# Patient Record
Sex: MALE | Race: WHITE | HISPANIC OR LATINO | URBAN - METROPOLITAN AREA
[De-identification: names, ages, dates, MRNs, and addresses within clinical notes are randomized per-mention and may not be internally consistent; named-entity substitution may affect disease eponyms.]

---

## 2020-09-15 ENCOUNTER — CONVERSION ENCOUNTER (OUTPATIENT)
Dept: UROLOGY | Facility: CLINIC | Age: 25
End: 2020-09-15

## 2020-09-15 ENCOUNTER — OFFICE VISIT CONVERTED (OUTPATIENT)
Dept: UROLOGY | Facility: CLINIC | Age: 25
End: 2020-09-15
Attending: NURSE PRACTITIONER

## 2020-10-06 ENCOUNTER — OFFICE VISIT CONVERTED (OUTPATIENT)
Dept: UROLOGY | Facility: CLINIC | Age: 25
End: 2020-10-06
Attending: UROLOGY

## 2020-10-07 ENCOUNTER — HOSPITAL ENCOUNTER (OUTPATIENT)
Dept: GENERAL RADIOLOGY | Facility: HOSPITAL | Age: 25
Discharge: HOME | End: 2020-10-07
Attending: NURSE PRACTITIONER

## 2021-05-10 NOTE — H&P
"   History and Physical      Patient Name: Sheng Mi   Patient ID: [id number]   Sex: Male   YOB: 1995        Visit Date: September 15, 2020    Provider: IDA Gomes   Location: [facility]   Location Address: [address] [place] [address], [address]  [place], [place]  [id number]   Location Phone: [phone]          Chief Complaint  · lump left testicle       History Of Present Illness  The patient is a 25 year old /White male , who presents for evaluation of lump on left testicle, he is self referred. He has not had any previous evaluation or imaging. He feels something \"in there\" referring to scrotum and has been there for a long time at least 2 years noticied while in . Began having some discomfort and pressure 2 weeks ago with sudden onset and discomfort has increased. Got out of  in March and on exit exam advised to check on left testicle if planning on having children in the future. He was tested for std and negative at that time. Not sexually active since then. He does have finacee and plans for marriage in the future. Denies dysuria or hematuria. No voiding complaints       Allergy List  NO KNOWN DRUG ALLERGIES         Family Medical History  Cancer, Unspecified; Diabetes         Social History  Alcohol (Never); Tobacco (Never)         Review of Systems  · Constitutional  o Denies  o : fever, headache, chills  · Eyes  o Denies  o : eye pain, double vision, blurred vision  · HENT  o Denies  o : sinus problems, sore throat, ear infection  · Cardiovascular  o Denies  o : chest pain, high blood pressure, varicosities  · Respiratory  o Denies  o : shortness of breath, wheezing, frequent cough  · Gastrointestinal  o Denies  o : nausea, vomiting, heartburn, indigestion, abdominal pain  · Genitourinary  o Admits  o : scrotal discomfort left and lumo  o Denies  o : urgency, frequency, urinary retention, painful urination  · Integument  o Denies  o : rash, itching, " "boils  · Neurologic  o Denies  o : tingling or numbness, tremors, dizzy spells  · Musculoskeletal  o Denies  o : joint pain, neck pain, back pain  · Endocrine  o Denies  o : cold intolerance, heat intolerance, tired, excessive thirst, sluggish  · Psychiatric  o Admits  o : feels satisfied with life  o Denies  o : severe depression, concerns with hurting themselves  · Heme-Lymph  o Denies  o : swollen glands, blood clotting problems  · Allergic-Immunologic  o Denies  o : sinus allergy symptoms, hay fever      Vitals  Date Time BP Position Site L\R Cuff Size HR RR TEMP (F) WT  HT  BMI kg/m2 BSA m2 O2 Sat HC       09/15/2020 02:15 PM 94/74 Sitting    75 - R   150lbs 0oz 5'  5\" 24.96 1.77           Physical Examination  · Constitutional  o Appearance  o : Well nourished, well developed patient in no acute distress. Ambulating without difficulty.  · Respiratory  o Respiratory Effort  o : Breathing is unlabored without accessory muscle use  o Inspection of Chest  o : normal appearance, no retractions  o Auscultation of Lungs  o : Normal breath sounds  · Cardiovascular  o Heart  o :   § Auscultation of Heart  § : regular rate and rhythm, no murmurs, gallops or rubs  o Peripheral Vascular System  o : No abnormalities  · Gastrointestinal  o Abdominal Examination  o : Scaphoid abdomen which is non-tender to palpation with normal tone and without rigidity or guarding. Normal bowel sounds. No masses present.  o Liver and spleen  o : No hepatomegaly present. Liver is non-tender to palpation and spleen is not palpable.  o Hernias  o : No abdominal wall hernias are present.  · Genitourinary  o Bladder  o : no abnormalities  o Penis  o : Normal appearance without lesion, discharge or masses.  o Urethral Meatus  o : no abnormalities  o Scrotum and Scrotal Contents  o :   § Scrotum  § : no abnormalities  § Epididymides  § : fullness and tenderness left epididymis. there is a varicocele present  § Testes  § : no " abnormalities  · Lymphatic  o Groin  o : lymph node right slight enlargement  · Skin and Subcutaneous Tissue  o General Inspection  o : No rashes, lesions or areas of discoloration present. Skin turgor is normal.  o General Palpation  o : No abnormalities, masses or tenderness on palpation.  · Neurologic  o Mental Status Examination  o : grossly oriented to person, place and time  o Gait and Station  o : normal gait, able to stand without difficulty  · Psychiatric  o Mood and Affect  o : mood normal, affect appropriate      Figure 1.0: Pain Rating Scale-Joe         Results  · In-Office Procedures  o Lab procedure  § Automated dipstick urinalysis with microscopy (23696)   § Color Ur: Yellow   § Clarity Ur: Clear   § Glucose Ur Ql Strip: Negative   § Bilirub Ur Ql Strip: Negative   § Ketones Ur Ql Strip: Negative   § Sp Gr Ur Qn: greater than 1.030   § Hgb Ur Ql Strip: Negative   § pH Ur-LsCnc: 7.5   § Prot Ur Ql Strip: Negative   § Urobilinogen Ur Strip-mCnc: 1.0 E.U./dL   § Nitrite Ur Ql Strip: Negative   § WBC Est Ur Ql Strip: Negative   § RBC UrnS Qn HPF: 0   § WBC UrnS Qn HPF: 0   § Bacteria UrnS Qn HPF: 0   § Crystals UrnS Qn HPF: amorphous sediment   § Epithelial Cells (non renal): 0 /HPF  § Epithelial Cells (renal): 0       Assessment  · Scrotal disorder     608.9/N50.9  · Varicocele     456.4/I86.1  · Epididymitis, left     604.90/N45.1    Problems Reconciled  Plan  · Medications  o doxycycline monohydrate 100 mg oral capsule   SIG: take 1 capsule (100 mg) by oral route 2 times per day for 21 days   DISP: (42) capsules with 0 refills  Prescribed on 09/15/2020     o Medications have been Reconciled  o Transition of Care or Provider Policy     will rx doxycycline for infection and follow up 3 weeks. will then evaluate with testicular ultrasound and if desired may consider surgical intervention of varicocele if desired.             Electronically Signed by: Marisa Gautam APRN -Author on September 15, 2020  03:20:57 PM

## 2021-05-10 NOTE — PROCEDURES
Procedure Note      Patient Name: Sheng Mi   Patient ID: [id number]   Sex: Male   YOB: 1995    Primary Care Provider: No PCP No PCP Other   Referring Provider: Joreg Valle MD    Visit Date: October 6, 2020    Provider: IDA Gomes   Location: [facility]   Location Address: [address] [place] [address], [address] [place] [address]  [id number]   Location Phone: [phone]          The patient is a 25 year old /White male presents today for a KUB   Clinical History : pelvic pain   Technique: KUB   Findings: lots of phleboliths in the pelvis. gas over the left kidney. no obvious stones seen,bones ok   Impression: as above           Assessment  · Pelvic pain     NOCODE/R10.2  · Testicular pain, left     608.9/N50.812    Problems Reconciled  Plan  · Orders  o KUB xray [facility] (60929) - NOCODE/R10.2, 608.9/N50.812 - 10/06/2020  · Medications  o Medications have been Reconciled  o Transition of Care or Provider Policy  · Instructions  o TIME OUT PROCEDURE: Correct patient and birth date; Correct procedure; Correct Physician; Consent signed            Electronically Signed by: Owen Brush RTR -Author on October 27, 2020 05:00:21 PM  Electronically Co-signed by: Jorge Valle MD -Reviewer on October 29, 2020 09:44:05 AM

## 2021-05-13 NOTE — PROGRESS NOTES
Progress Note      Patient Name: Sheng Mi   Patient ID: [id number]   Sex: Male   YOB: 1995    Primary Care Provider: No PCP No PCP Other   Referring Provider: Jorge Valle MD    Visit Date: October 6, 2020    Provider: IDA Gomes   Location: [facility]   Location Address: [address] [place] [address], [address]  [place], [place]  [id number]   Location Phone: [phone]          Chief Complaint  · follow up       History Of Present Illness  The patient is a 25 year old /White male , who presents to follow up on left testicle pain. He has taken the doxycycline and states the pain seems worse. It starts in the testicle and shoots up into the left lower quadrant region. Denies dysuria or gross hematuria. Denies any changes in voiding. He states he is unable to work due to the pain. Denies dysuria or gross hematuria. Denies any history of kidney.       Allergy List  NO KNOWN DRUG ALLERGIES         Family Medical History  Cancer, Unspecified; Diabetes         Social History  Alcohol (Never); Tobacco (Never)         Review of Systems  · Constitutional  o Denies  o : fever, headache, chills  · Eyes  o Denies  o : eye pain, double vision, blurred vision  · HENT  o Denies  o : sinus problems, sore throat, ear infection  · Cardiovascular  o Denies  o : chest pain, high blood pressure, varicosities  · Respiratory  o Denies  o : shortness of breath, wheezing, frequent cough  · Gastrointestinal  o Denies  o : nausea, vomiting, heartburn, indigestion,   · Genitourinary  o Admits  o : scrotal pain  o Denies  o : urgency, frequency, urinary retention, painful urination  · Integument  o Denies  o : rash, itching, boils  · Neurologic  o Denies  o : tingling or numbness, tremors, dizzy spells  · Musculoskeletal  o Denies  o : joint pain, neck pain, back pain  · Endocrine  o Denies  o : cold intolerance, heat intolerance, tired, excessive thirst, sluggish  · Psychiatric  o Admits  o : feels  "satisfied with life  o Denies  o : severe depression, concerns with hurting themselves  · Heme-Lymph  o Denies  o : swollen glands, blood clotting problems  · Allergic-Immunologic  o Denies  o : sinus allergy symptoms, hay fever      Vitals  Date Time BP Position Site L\R Cuff Size HR RR TEMP (F) WT  HT  BMI kg/m2 BSA m2 O2 Sat FR L/min FiO2 HC       10/06/2020 10:49 /73 Sitting    68 - R  97.9 150lbs 0oz 5'  6\" 24.21 1.78             Physical Examination  · Constitutional  o Appearance  o : Well nourished, well developed patient in no acute distress. Ambulating without difficulty.  · Respiratory  o Respiratory Effort  o : breathing unlabored  o Inspection of Chest  o : normal appearance, no retractions  o Auscultation of Lungs  o : normal breath sounds throughout  · Cardiovascular  o Heart  o :   § Auscultation of Heart  § : regular rate and rhythm, no murmurs, gallops or rubs  o Peripheral Vascular System  o : No abnormalities  · Gastrointestinal  o Abdominal Examination  o : Scaphoid abdomen which is non-tender to palpation with normal tone and without rigidity or guarding. Normal bowel sounds. No masses present mildly tender left groin pelvic region.  o Liver and spleen  o : no abnormalities  o Hernias  o : no hernias present  · Genitourinary  o Bladder  o : no abnormalities  o Penis  o : Normal appearance without lesion, discharge or masses.  o Urethral Meatus  o : no abnormalities  o Scrotum and Scrotal Contents  o :   § Scrotum  § : no abnormalities  § Epididymides  § : small probably varicocele left   § Testes  § : no abnormalities  o Digital Rectal Examination  o :   § Prostate  § : nontender to palpation, size normal, no nodules present, consistency normal  · Lymphatic  o Groin  o : No lymphadenopathy present  · Skin and Subcutaneous Tissue  o General Inspection  o : No rashes, lesions or areas of discoloration present. Skin turgor is normal.  o General Palpation  o : No abnormalities, masses or " tenderness on palpation.  · Neurologic  o Mental Status Examination  o : grossly oriented to person, place and time  o Gait and Station  o : normal gait, able to stand without difficulty  · Psychiatric  o Mood and Affect  o : mood anxious affect appropriate      Figure 1.0: Pain Rating Scale-Blanket         Results  · In-Office Procedures  o Lab procedure  § Automated dipstick urinalysis with microscopy ([id number])   § Color Ur: Dark yellow   § Clarity Ur: Clear   § Glucose Ur Ql Strip: Negative   § Bilirub Ur Ql Strip: Negative   § Ketones Ur Ql Strip: Negative   § Sp Gr Ur Qn: 1.020   § Hgb Ur Ql Strip: Negative   § pH Ur-LsCnc: 7.0   § Prot Ur Ql Strip: Trace   § Urobilinogen Ur Strip-mCnc: 1.0 E.U./dL   § Nitrite Ur Ql Strip: Negative   § WBC Est Ur Ql Strip: Negative   § RBC UrnS Qn HPF: 0   § WBC UrnS Qn HPF: 0   § Bacteria UrnS Qn HPF: 0   § Crystals UrnS Qn HPF: 0   § Epithelial Cells (non renal): 0 /HPF  § Epithelial Cells (renal): 0       Assessment  · Testicular pain, left     608.9/N50.812  · Pelvic pain     NOCODE/R10.2  left  · Varicocele     456.4/I86.1    Problems Reconciled  Plan  · Orders  o Testicular ultrasound with duplex scan (55024, 00687) - 608.9/N50.812 - 10/06/2020  · Medications  o Medications have been Reconciled  o Transition of Care or Provider Policy     patient having some discomfort left testicle. not significant pain with palpation but states can't work. testicular ultrasound ordered for am and will call if significant abnormality and follow up sooner. otherwise follow up with dr Valle to discuss. kub just to be sure no ureteral stones multiple phleboliths present no ureteral stones. He has probably small varicocele left unchanged from previous visit. I have advised ibuprofen and Tylenol for discomfort until ultrasound results. he has completed doxycycline to cover any epididimytis and states worsening of pain and no improvement.             Electronically Signed by: IDA Gomes  -Author on October 7, 2020 06:39:26 PM

## 2021-05-14 VITALS
BODY MASS INDEX: 24.11 KG/M2 | HEART RATE: 68 BPM | TEMPERATURE: 97.9 F | WEIGHT: 150 LBS | HEIGHT: 66 IN | DIASTOLIC BLOOD PRESSURE: 73 MMHG | SYSTOLIC BLOOD PRESSURE: 128 MMHG

## 2021-05-14 VITALS
HEIGHT: 65 IN | WEIGHT: 150 LBS | SYSTOLIC BLOOD PRESSURE: 94 MMHG | HEART RATE: 75 BPM | DIASTOLIC BLOOD PRESSURE: 74 MMHG | BODY MASS INDEX: 24.99 KG/M2

## 2021-08-27 ENCOUNTER — OFFICE VISIT (OUTPATIENT)
Dept: FAMILY MEDICINE CLINIC | Facility: CLINIC | Age: 26
End: 2021-08-27

## 2021-08-27 VITALS
OXYGEN SATURATION: 98 % | SYSTOLIC BLOOD PRESSURE: 140 MMHG | RESPIRATION RATE: 18 BRPM | TEMPERATURE: 99.8 F | HEART RATE: 83 BPM | DIASTOLIC BLOOD PRESSURE: 60 MMHG

## 2021-08-27 DIAGNOSIS — R11.2 NAUSEA VOMITING AND DIARRHEA: ICD-10-CM

## 2021-08-27 DIAGNOSIS — R19.7 NAUSEA VOMITING AND DIARRHEA: ICD-10-CM

## 2021-08-27 DIAGNOSIS — J06.9 UPPER RESPIRATORY TRACT INFECTION, UNSPECIFIED TYPE: Primary | ICD-10-CM

## 2021-08-27 PROCEDURE — 99203 OFFICE O/P NEW LOW 30 MIN: CPT | Performed by: NURSE PRACTITIONER

## 2021-08-27 PROCEDURE — U0003 INFECTIOUS AGENT DETECTION BY NUCLEIC ACID (DNA OR RNA); SEVERE ACUTE RESPIRATORY SYNDROME CORONAVIRUS 2 (SARS-COV-2) (CORONAVIRUS DISEASE [COVID-19]), AMPLIFIED PROBE TECHNIQUE, MAKING USE OF HIGH THROUGHPUT TECHNOLOGIES AS DESCRIBED BY CMS-2020-01-R: HCPCS | Performed by: NURSE PRACTITIONER

## 2021-08-27 RX ORDER — AZITHROMYCIN 250 MG/1
TABLET, FILM COATED ORAL
Qty: 6 TABLET | Refills: 0 | Status: SHIPPED | OUTPATIENT
Start: 2021-08-27 | End: 2022-07-27

## 2021-08-27 NOTE — PROGRESS NOTES
Chief Complaint  Cough, Fever, Headache, Vomiting, and Nasal Congestion    Subjective       History of Present Illness  Sheng Mi presents to [facility]     Establish care, he has had congestion cough fever and headache with vomiting nausea and diarrhea for the last 10 days.  He has had no known Covid exposure but says several people at work have been out sick.  He has not had any vomiting or diarrhea today.  Denies wheezing or shortness of breath.  He has not been taking anything over-the-counter.      Past Medical History:   • Headache   • Sinus trouble       Allergies  Patient has no known allergies.    No past surgical history on file.    Social History     Tobacco Use   • Smoking status: Former Smoker     Packs/day: 2.00     Years: 5.00     Pack years: 10.00     Types: Cigarettes     Quit date:      Years since quittin.6   • Smokeless tobacco: Never Used   Substance Use Topics   • Alcohol use: Not on file   • Drug use: Never       Family History   Problem Relation Age of Onset   • Cancer Other         UNSPECIFIED   • Diabetes Other         UNSPECIFIED   • Diabetes Father    • Cancer Paternal Grandmother         Health Maintenance Due   Topic Date Due   • ANNUAL PHYSICAL  Never done   • COVID-19 Vaccine (1) Never done   • TDAP/TD VACCINES (1 - Tdap) Never done   • HEPATITIS C SCREENING  Never done          Current Outpatient Medications:   •  azithromycin (Zithromax) 250 MG tablet, Take 2 tablets today and 1 daily for 4 days, Disp: 6 tablet, Rfl: 0      There is no immunization history on file for this patient.    Objective     Vitals:    21 1239   BP: 140/60   Pulse: 83   Resp: 18   Temp: 99.8 °F (37.7 °C)   SpO2: 98%     There is no height or weight on file to calculate BMI.     Physical Exam  Vitals reviewed.   Constitutional:       Appearance: Normal appearance. He is well-developed.   HENT:      Right Ear: Tympanic membrane and ear canal normal. There  is no impacted cerumen.      Left Ear: Tympanic membrane and ear canal normal. There is no impacted cerumen.      Nose: Congestion present. No rhinorrhea.      Mouth/Throat:      Pharynx: No oropharyngeal exudate.   Eyes:      General: No scleral icterus.        Right eye: No discharge.         Left eye: No discharge.      Conjunctiva/sclera: Conjunctivae normal.   Cardiovascular:      Rate and Rhythm: Normal rate and regular rhythm.      Heart sounds: Normal heart sounds. No murmur heard.     Pulmonary:      Effort: Pulmonary effort is normal.      Breath sounds: Normal breath sounds.   Abdominal:      General: Abdomen is flat. There is no distension.      Palpations: Abdomen is soft.      Tenderness: There is no abdominal tenderness.   Neurological:      Mental Status: He is alert and oriented to person, place, and time.      Cranial Nerves: No cranial nerve deficit.      Motor: No weakness.   Psychiatric:         Mood and Affect: Mood and affect normal.              Assessment and Plan     Diagnoses and all orders for this visit:    1. Upper respiratory tract infection, unspecified type (Primary)  Comments:  We will check Covid but since he has had symptoms for 10 days we will go ahead and start on antibiotics and give him a work note to return on Tuesday unless sym  Orders:  -     azithromycin (Zithromax) 250 MG tablet; Take 2 tablets today and 1 daily for 4 days  Dispense: 6 tablet; Refill: 0  -     COVID-19,CEPHEID/VELMA/BDMAX,COR/MART/PAD/KEO IN-HOUSE(OR EMERGENT/ADD-ON),NP SWAB IN TRANSPORT MEDIA 3-4 HR TAT, RT-PCR - Swab, Nasopharynx    2. Nausea vomiting and diarrhea  Comments:  Symptoms are improved today, he is going to take Pepcid 20 mg twice a day and see how it does  Orders:  -     COVID-19,CEPHEID/VELMA/BDMAX,COR/MART/PAD/KEO IN-HOUSE(OR EMERGENT/ADD-ON),NP SWAB IN TRANSPORT MEDIA 3-4 HR TAT, RT-PCR - Swab, Nasopharynx            Follow Up     Return if symptoms worsen or fail to improve.    Patient was  given instructions and counseling regarding his condition or for health maintenance advice. Please see specific information pulled into the AVS if appropriate.     Sheng Mi  reports that he quit smoking about 19 months ago. His smoking use included cigarettes. He has a 10.00 pack-year smoking history. He has never used smokeless tobacco.           IDA Watts

## 2021-08-29 LAB — SARS-COV-2 RNA RESP QL NAA+PROBE: NOT DETECTED

## 2022-07-27 ENCOUNTER — OFFICE VISIT (OUTPATIENT)
Dept: UROLOGY | Facility: CLINIC | Age: 27
End: 2022-07-27

## 2022-07-27 VITALS
TEMPERATURE: 98.4 F | HEIGHT: 66 IN | SYSTOLIC BLOOD PRESSURE: 137 MMHG | DIASTOLIC BLOOD PRESSURE: 72 MMHG | BODY MASS INDEX: 24.59 KG/M2 | HEART RATE: 66 BPM | WEIGHT: 153 LBS

## 2022-07-27 DIAGNOSIS — N50.819 PAIN IN TESTICLE, UNSPECIFIED LATERALITY: Primary | ICD-10-CM

## 2022-07-27 DIAGNOSIS — R10.12 LEFT UPPER QUADRANT ABDOMINAL PAIN: ICD-10-CM

## 2022-07-27 LAB
BILIRUB BLD-MCNC: NEGATIVE MG/DL
CLARITY, POC: CLEAR
COLOR UR: YELLOW
GLUCOSE UR STRIP-MCNC: NEGATIVE MG/DL
KETONES UR QL: NEGATIVE
LEUKOCYTE EST, POC: NEGATIVE
NITRITE UR-MCNC: NEGATIVE MG/ML
PH UR: 7 [PH] (ref 5–8)
PROT UR STRIP-MCNC: NEGATIVE MG/DL
RBC # UR STRIP: ABNORMAL /UL
SP GR UR: 1.02 (ref 1–1.03)
UROBILINOGEN UR QL: NORMAL

## 2022-07-27 PROCEDURE — 99214 OFFICE O/P EST MOD 30 MIN: CPT | Performed by: UROLOGY

## 2022-07-27 NOTE — PROGRESS NOTES
"Chief Complaint  Left testicular pain    Left upper quadrant pain    Left varicocele    Subjective Patient seems to be in some discomfort        Sheng Mi presents to [facility]  History of Present Illness    27-year-old white male has been having pain in the left testicle radiating to left groin and left upper quadrant for the last 2 years.  This he has been a persistent pain for the last 2 days and gives him a lot of discomfort in the left upper quadrant.  Sometimes the pain disappears but then it comes back and become persistent.  Grade of the pain is about 6 to 8 x 10 sometimes majority of the time it is tolerable.  Patient never had a kidney stone.  There is no dysuria or gross hematuria.  No history of sexually transmitted disease.  Patient is  and has no problem with sexual intercourse or ejaculation.  Patient does not have any children.  Patient is an  by profession.    Objective Patient seems to be in some discomfort  Vital Signs:   /72   Pulse 66   Temp 98.4 °F (36.9 °C)   Ht 167.6 cm (66\")   Wt 69.4 kg (153 lb)   BMI 24.69 kg/m²     No Known Allergies   Past medical history:  has a past medical history of Headache and Sinus trouble.   Past surgical history:  has no past surgical history on file.  Personal history: family history includes Cancer in his paternal grandmother and another family member; Diabetes in his father and another family member.  Social history:  reports that he quit smoking about 2 years ago. His smoking use included cigarettes. He has a 10.00 pack-year smoking history. He has never used smokeless tobacco. He reports that he does not use drugs.    Review of Systems     Physical Exam  Constitutional:       Appearance: Normal appearance. He is normal weight. He is not ill-appearing or toxic-appearing.      Comments: Patient is in some discomfort but not in excruciating pain   HENT:      Head: Normocephalic and atraumatic.     "  Ears:      Comments: No loss of hearing  Cardiovascular:      Rate and Rhythm: Normal rate and regular rhythm.      Pulses: Normal pulses.      Heart sounds: Normal heart sounds. No murmur heard.  Pulmonary:      Effort: Pulmonary effort is normal.      Breath sounds: Normal breath sounds. No rhonchi or rales.   Abdominal:      General: Abdomen is flat.      Palpations: Abdomen is soft. There is no mass.      Tenderness: There is abdominal tenderness. There is no right CVA tenderness or left CVA tenderness.      Hernia: No hernia is present.      Comments: Slight tenderness in left upper quadrant but no rigidity or rebound tenderness   Genitourinary:     Comments: Circumcised penis and does have adhesions of the foreskin on the right glans penis.    Right and left scrotum is normal.    Right testicle and epididymis is normal.    Left testicle and epididymis is normal.  Patient has a large varicocele.    JAMEL.  Prostate gland is just about 15 g and benign  Musculoskeletal:         General: No swelling. Normal range of motion.      Cervical back: Normal range of motion and neck supple. No rigidity or tenderness.   Lymphadenopathy:      Cervical: No cervical adenopathy.   Skin:     General: Skin is warm.      Coloration: Skin is not jaundiced.   Neurological:      General: No focal deficit present.      Mental Status: He is alert and oriented to person, place, and time.      Motor: No weakness.      Gait: Gait normal.   Psychiatric:         Mood and Affect: Mood normal.         Behavior: Behavior normal.         Thought Content: Thought content normal.         Judgment: Judgment normal.        Result Review :                  Checked the ultrasound of testicles done in 2020 and patient had tiny spermatoceles and left varicocele.  Minor hydrocele.  Assessment and Plan    Diagnoses and all orders for this visit:    1. Pain in testicle, unspecified laterality (Primary)  -     POC Urinalysis Dipstick    2. Left upper  quadrant abdominal pain      Because of the pain in the left upper quadrant, I am going to go ahead and order retroperitoneal ultrasound to make sure there is nothing wrong with the kidney as he has a varicocele on the left side.  I will also do CBC CMP and repeat ultrasound of testicle and see him after that.    I did microscopy on the urine and I do not see any RBCs in the urine  Brief Urine Lab Results  (Last result in the past 365 days)      Color   Clarity   Blood   Leuk Est   Nitrite   Protein   CREAT   Urine HCG        07/27/22 0928 Yellow   Clear   Trace   Negative   Negative   Negative                  Follow Up   No follow-ups on file.  Patient was given instructions and counseling regarding his condition or for health maintenance advice. Please see specific information pulled into the AVS if appropriate.     Jorge Valle MD

## 2022-08-03 DIAGNOSIS — N50.812 PAIN IN LEFT TESTICLE: Primary | ICD-10-CM

## 2022-08-03 RX ORDER — CIPROFLOXACIN 500 MG/1
500 TABLET, FILM COATED ORAL 2 TIMES DAILY
Qty: 14 TABLET | Refills: 0 | Status: SHIPPED | OUTPATIENT
Start: 2022-08-03 | End: 2022-09-06

## 2022-08-05 ENCOUNTER — HOSPITAL ENCOUNTER (OUTPATIENT)
Dept: ULTRASOUND IMAGING | Facility: HOSPITAL | Age: 27
Discharge: HOME | End: 2022-08-05
Admitting: UROLOGY

## 2022-08-05 DIAGNOSIS — R10.12 LEFT UPPER QUADRANT ABDOMINAL PAIN: ICD-10-CM

## 2022-08-05 DIAGNOSIS — N50.819 PAIN IN TESTICLE, UNSPECIFIED LATERALITY: ICD-10-CM

## 2022-08-05 PROCEDURE — 76870 US EXAM SCROTUM: CPT

## 2022-08-10 ENCOUNTER — OFFICE VISIT (OUTPATIENT)
Dept: UROLOGY | Facility: CLINIC | Age: 27
End: 2022-08-10

## 2022-08-10 VITALS
HEIGHT: 66 IN | TEMPERATURE: 98.6 F | BODY MASS INDEX: 24.59 KG/M2 | WEIGHT: 153 LBS | DIASTOLIC BLOOD PRESSURE: 78 MMHG | SYSTOLIC BLOOD PRESSURE: 117 MMHG

## 2022-08-10 DIAGNOSIS — R10.32 INGUINAL PAIN, LEFT: ICD-10-CM

## 2022-08-10 DIAGNOSIS — N50.819 PERSISTENT TESTICULAR PAIN: Primary | ICD-10-CM

## 2022-08-10 DIAGNOSIS — I86.1 LEFT VARICOCELE: ICD-10-CM

## 2022-08-10 LAB
BILIRUB BLD-MCNC: NEGATIVE MG/DL
CLARITY, POC: CLEAR
COLOR UR: YELLOW
EXPIRATION DATE: ABNORMAL
GLUCOSE UR STRIP-MCNC: NEGATIVE MG/DL
KETONES UR QL: NEGATIVE
LEUKOCYTE EST, POC: NEGATIVE
Lab: ABNORMAL
NITRITE UR-MCNC: NEGATIVE MG/ML
PH UR: 8.5 [PH] (ref 5–8)
PROT UR STRIP-MCNC: NEGATIVE MG/DL
RBC # UR STRIP: NEGATIVE /UL
SP GR UR: 1.02 (ref 1–1.03)
UROBILINOGEN UR QL: NORMAL

## 2022-08-10 PROCEDURE — 99214 OFFICE O/P EST MOD 30 MIN: CPT | Performed by: NURSE PRACTITIONER

## 2022-08-10 RX ORDER — CYCLOBENZAPRINE HCL 10 MG
10 TABLET ORAL 2 TIMES DAILY PRN
Qty: 30 TABLET | Refills: 0 | Status: SHIPPED | OUTPATIENT
Start: 2022-08-10 | End: 2022-09-06

## 2022-08-10 RX ORDER — IBUPROFEN 800 MG/1
800 TABLET ORAL EVERY 8 HOURS PRN
Qty: 30 TABLET | Refills: 0 | Status: SHIPPED | OUTPATIENT
Start: 2022-08-10

## 2022-08-10 RX ORDER — SULFAMETHOXAZOLE AND TRIMETHOPRIM 800; 160 MG/1; MG/1
1 TABLET ORAL 2 TIMES DAILY
Qty: 14 TABLET | Refills: 0 | Status: SHIPPED | OUTPATIENT
Start: 2022-08-10 | End: 2022-08-20

## 2022-08-10 NOTE — PROGRESS NOTES
"Chief Complaint  Follow-up and Testicle Pain    Subjective          Sheng Mi 27 y.o.pleasant  male presents to [facility]  for follow up results of repeat testicular ultrasound which indicated presence of large left varicocele. Patient with acute on chronic left testicular pain. This particular episode began 3 weeks ago.   Dr Valle evaluated recently and patient did not complete the abdominal ultrasound  stating the discomfort in his abdomen during visit resolved that day and did not feel necessary. Understandably,  it would have been additional financial burden not having medical insurance. Cipro provided 2 days cipro prior to the ultrasound and still does not notice any significant improvement in symptoms. Discomfort in his left testes however describes intermittent brief sharp shooting pains that originates in the left testicle and radiates into his left inguinal region reported only occurs with physical activity such as climbing stairs, bending, and crawling required for his job. He does not currently feel the sharp shooting pain but has the constant pressure and discomfort in his left testicle for the past 3-4 weeks. Denies dysuria or gross hematuria. Voids approximately 6 times during the day and nocturia -1. No problems with urgency or urge incontinence. Reports prostate checked per Dr Valle and told it was ok. Trace blood was noted on urinalysis that visit. Urine is clear without blood today. No history of std or kidney stones.     Review of Systems   Constitutional: Negative for chills and fever.   Gastrointestinal: Negative.    Genitourinary: Positive for frequency and testicular pain. Negative for dysuria, flank pain, hematuria, penile pain, penile swelling and urgency.      Objective   Vital Signs:   /78   Temp 98.6 °F (37 °C)   Ht 167.6 cm (66\")   Wt 69.4 kg (153 lb)   BMI 24.69 kg/m²      History reviewed. No pertinent surgical history.     Physical " Exam  Vitals and nursing note reviewed. Exam conducted with a chaperone present.   Constitutional:       General: He is not in acute distress.     Appearance: Normal appearance. He is well-developed and well-groomed. He is not ill-appearing.      Comments: Ambulates without difficulty   Cardiovascular:      Rate and Rhythm: Normal rate and regular rhythm.   Pulmonary:      Effort: Pulmonary effort is normal.      Breath sounds: Normal breath sounds and air entry.   Abdominal:      General: Bowel sounds are normal. There is no distension.      Palpations: Abdomen is soft. There is no mass.      Tenderness: There is no abdominal tenderness. There is no guarding or rebound.      Hernia: No hernia is present. There is no hernia in the left inguinal area or right inguinal area.   Genitourinary:     Penis: No erythema, tenderness, discharge or swelling.       Comments: Right testicle without tenderness. Mild swelling of left scrotum and fullness left anterior   Musculoskeletal:         General: Normal range of motion.   Lymphadenopathy:      Lower Body: Left inguinal adenopathy (2 small shotty nodes left nontender. No inguinal hernia present) present.   Skin:     General: Skin is warm and dry.   Neurological:      Mental Status: He is alert and oriented to person, place, and time.      Motor: Motor function is intact.   Psychiatric:         Mood and Affect: Mood normal.         Behavior: Behavior normal. Behavior is cooperative.         Thought Content: Thought content normal.         Judgment: Judgment normal.        Result Review :        POC Urinalysis Dipstick, Automated (08/10/2022 09:23)         US Scrotum & Testicles (08/05/2022 11:44)  US Scrotum & Testicles (10/07/2020 09:33)       Assessment and Plan    Diagnoses and all orders for this visit:    1. Persistent testicular pain (Primary)  -     POC Urinalysis Dipstick, Automated  -     cyclobenzaprine (FLEXERIL) 10 MG tablet; Take 1 tablet by mouth 2 (Two) Times a  Day As Needed for Muscle Spasms. No driving on medication may cause drowsiness  Dispense: 30 tablet; Refill: 0  -     sulfamethoxazole-trimethoprim (Bactrim DS) 800-160 MG per tablet; Take 1 tablet by mouth 2 (Two) Times a Day for 10 days. Start after completes cipro  Dispense: 14 tablet; Refill: 0  -     ibuprofen (ADVIL,MOTRIN) 800 MG tablet; Take 1 tablet by mouth Every 8 (Eight) Hours As Needed for Moderate Pain .  Dispense: 30 tablet; Refill: 0    2. Inguinal pain, left  -     cyclobenzaprine (FLEXERIL) 10 MG tablet; Take 1 tablet by mouth 2 (Two) Times a Day As Needed for Muscle Spasms. No driving on medication may cause drowsiness  Dispense: 30 tablet; Refill: 0  -     sulfamethoxazole-trimethoprim (Bactrim DS) 800-160 MG per tablet; Take 1 tablet by mouth 2 (Two) Times a Day for 10 days. Start after completes cipro  Dispense: 14 tablet; Refill: 0  -     ibuprofen (ADVIL,MOTRIN) 800 MG tablet; Take 1 tablet by mouth Every 8 (Eight) Hours As Needed for Moderate Pain .  Dispense: 30 tablet; Refill: 0    3. Left varicocele  -     cyclobenzaprine (FLEXERIL) 10 MG tablet; Take 1 tablet by mouth 2 (Two) Times a Day As Needed for Muscle Spasms. No driving on medication may cause drowsiness  Dispense: 30 tablet; Refill: 0  -     sulfamethoxazole-trimethoprim (Bactrim DS) 800-160 MG per tablet; Take 1 tablet by mouth 2 (Two) Times a Day for 10 days. Start after completes cipro  Dispense: 14 tablet; Refill: 0  -     ibuprofen (ADVIL,MOTRIN) 800 MG tablet; Take 1 tablet by mouth Every 8 (Eight) Hours As Needed for Moderate Pain .  Dispense: 30 tablet; Refill: 0    Patient with left varicocele noted per recent repeat testicular ultrasound. Fullness left epididymal head with tenderness on exam. Describes pain originating in the testes however the sharp shooting pain into the inguinal region is described as severe and worsens with activity such as climbing and physical activity.  Complete cipro and then begin bactrim for  another week , anti-inflammatory ibuprofen,and  stretching exercises. Will also try muscle relaxant. Also advised applying ice and may alternate with heat.  Recheck pt in 3-4 weeks to see if improvement in symptoms.  Plan  refer for possible left varicocelectomy however patient does not have medical insurance at this time and wishes to try and obtain prior to referral. He and spouse did not qualify for passport. May want to check with hospital to see if any financial assistance programs available.  I feel it may be benefical for patient to have a varicocelectomy since he has persistant discomfort in the testes since for several years. and  I have explained that surgical intervention may help the chronic dull persistant  pain from the varicocele however  Not guarantee resolution of associated sharp shooting pains which may be caused by other factors to include ligamental/muscular strain and/or irritation and inflammation of surrounding nerves.  Patient does alter his position when standing due to discomfort in the testes.  Offered local with lidocaine spermatic cord region while in office, patient declined.      Follow Up   Return in about 4 weeks (around 9/7/2022) for recheck.  Patient was given instructions and counseling regarding his condition or for health maintenance advice. Please see specific information pulled into the AVS if appropriate.     IDA Whitney

## 2022-09-06 ENCOUNTER — OFFICE VISIT (OUTPATIENT)
Dept: UROLOGY | Facility: CLINIC | Age: 27
End: 2022-09-06

## 2022-09-06 VITALS
BODY MASS INDEX: 24.59 KG/M2 | WEIGHT: 153 LBS | HEART RATE: 80 BPM | DIASTOLIC BLOOD PRESSURE: 69 MMHG | HEIGHT: 66 IN | SYSTOLIC BLOOD PRESSURE: 130 MMHG | TEMPERATURE: 97.8 F

## 2022-09-06 DIAGNOSIS — R10.32 INGUINAL PAIN, LEFT: Primary | ICD-10-CM

## 2022-09-06 DIAGNOSIS — I86.1 LEFT VARICOCELE: ICD-10-CM

## 2022-09-06 DIAGNOSIS — N50.819 PERSISTENT TESTICULAR PAIN: ICD-10-CM

## 2022-09-06 LAB
BILIRUB BLD-MCNC: NEGATIVE MG/DL
CLARITY, POC: CLEAR
COLOR UR: YELLOW
GLUCOSE UR STRIP-MCNC: NEGATIVE MG/DL
KETONES UR QL: NEGATIVE
LEUKOCYTE EST, POC: NEGATIVE
NITRITE UR-MCNC: NEGATIVE MG/ML
PH UR: 7.5 [PH] (ref 5–8)
PROT UR STRIP-MCNC: NEGATIVE MG/DL
RBC # UR STRIP: NEGATIVE /UL
SP GR UR: 1.02 (ref 1–1.03)
UROBILINOGEN UR QL: NORMAL

## 2022-09-06 PROCEDURE — 99213 OFFICE O/P EST LOW 20 MIN: CPT | Performed by: NURSE PRACTITIONER

## 2022-09-06 RX ORDER — CYCLOBENZAPRINE HCL 10 MG
10 TABLET ORAL 2 TIMES DAILY PRN
Qty: 30 TABLET | Refills: 2 | Status: SHIPPED | OUTPATIENT
Start: 2022-09-06

## 2022-09-06 NOTE — PROGRESS NOTES
"Chief Complaint  Testicle Pain    Subjective          Sheng Mi 27 y.o. male presents to [facility]  Follow up on left inguinal and scrotal pain. Prescribed additional antibiotic and muscle relaxant to see if improvement. Noted hydrocele left last ultrasound however main complaint is pain left llq and groin region with activity. Patient reports that the inguinal pain has improved. Still intermittent with physical activity. No discomfort in left testes today.  Muscle relaxant did seem to help. No voiding complaints. He has been doing some stretching exercises.     Review of Systems   Constitutional: Negative for chills and fever.   Genitourinary: Negative for difficulty urinating, dysuria and hematuria.      Objective   Vital Signs:   /69   Pulse 80   Temp 97.8 °F (36.6 °C)   Ht 167.6 cm (66\")   Wt 69.4 kg (153 lb)   BMI 24.69 kg/m²      History reviewed. No pertinent surgical history.     Physical Exam  Vitals and nursing note reviewed.   Constitutional:       General: He is not in acute distress.     Appearance: Normal appearance. He is well-developed. He is not ill-appearing.      Comments: Ambulates without difficulty   Cardiovascular:      Rate and Rhythm: Normal rate.   Pulmonary:      Effort: Pulmonary effort is normal.      Breath sounds: Normal air entry.   Genitourinary:     Comments: Fullness left epididymis has decreased non- tender. Very small hydrocele per last testicular ultrasound. Today on palpation feeling more of varicocele small.   Musculoskeletal:         General: Normal range of motion.   Skin:     General: Skin is warm and dry.   Neurological:      Mental Status: He is alert and oriented to person, place, and time.      Motor: Motor function is intact.   Psychiatric:         Mood and Affect: Mood normal.         Behavior: Behavior normal. Behavior is cooperative.         Thought Content: Thought content normal.         Judgment: Judgment normal.      "   Result Review :        Office Visit with Marisa Gautam APRN (08/10/2022)  POC Urinalysis Dipstick (09/06/2022 10:37)         US Scrotum & Testicles (08/05/2022 11:44)  Office Visit Converted with Jorge Valle MD (10/06/2020)       Assessment and Plan    Diagnoses and all orders for this visit:    1. Inguinal pain, left (Primary)  -     cyclobenzaprine (FLEXERIL) 10 MG tablet; Take 1 tablet by mouth 2 (Two) Times a Day As Needed for Muscle Spasms. No driving on medication may cause drowsiness  Dispense: 30 tablet; Refill: 2    2. Persistent testicular pain  -     POC Urinalysis Dipstick  -     cyclobenzaprine (FLEXERIL) 10 MG tablet; Take 1 tablet by mouth 2 (Two) Times a Day As Needed for Muscle Spasms. No driving on medication may cause drowsiness  Dispense: 30 tablet; Refill: 2    3. Left varicocele  -     cyclobenzaprine (FLEXERIL) 10 MG tablet; Take 1 tablet by mouth 2 (Two) Times a Day As Needed for Muscle Spasms. No driving on medication may cause drowsiness  Dispense: 30 tablet; Refill: 2    refill muscle relaxant which improves pain. Stretching exercises as discussed and exercise sheets provided Patient to call for follow up once  insurance issues resolved. Will then Consider referral for evaluation of hydrocele. Patient notes pain beginning after manipulation. Discussed possible stimulation of nerves fibers and associated nerve pain.     Follow Up   Return for patient will call after established insurance of if worsening.  Patient was given instructions and counseling regarding his condition or for health maintenance advice. Please see specific information pulled into the AVS if appropriate.     IDA Whitney

## 2022-12-05 ENCOUNTER — TRANSCRIBE ORDERS (OUTPATIENT)
Dept: ADMINISTRATIVE | Facility: HOSPITAL | Age: 27
End: 2022-12-05

## 2022-12-05 ENCOUNTER — LAB (OUTPATIENT)
Dept: LAB | Facility: HOSPITAL | Age: 27
End: 2022-12-05

## 2022-12-05 DIAGNOSIS — I86.1 SCROTAL VARICES: Primary | ICD-10-CM

## 2022-12-05 DIAGNOSIS — I86.1 SCROTAL VARICES: ICD-10-CM

## 2022-12-05 LAB
ANION GAP SERPL CALCULATED.3IONS-SCNC: 12.2 MMOL/L (ref 5–15)
BASOPHILS # BLD AUTO: 0.04 10*3/MM3 (ref 0–0.2)
BASOPHILS NFR BLD AUTO: 1 % (ref 0–1.5)
BUN SERPL-MCNC: 12 MG/DL (ref 6–20)
BUN/CREAT SERPL: 11.9 (ref 7–25)
CALCIUM SPEC-SCNC: 9.7 MG/DL (ref 8.6–10.5)
CHLORIDE SERPL-SCNC: 98 MMOL/L (ref 98–107)
CO2 SERPL-SCNC: 29.8 MMOL/L (ref 22–29)
CREAT SERPL-MCNC: 1.01 MG/DL (ref 0.76–1.27)
DEPRECATED RDW RBC AUTO: 37.5 FL (ref 37–54)
EGFRCR SERPLBLD CKD-EPI 2021: 105.8 ML/MIN/1.73
EOSINOPHIL # BLD AUTO: 0.04 10*3/MM3 (ref 0–0.4)
EOSINOPHIL NFR BLD AUTO: 1 % (ref 0.3–6.2)
ERYTHROCYTE [DISTWIDTH] IN BLOOD BY AUTOMATED COUNT: 11.4 % (ref 12.3–15.4)
GLUCOSE SERPL-MCNC: 89 MG/DL (ref 65–99)
HCT VFR BLD AUTO: 44.8 % (ref 37.5–51)
HGB BLD-MCNC: 15.9 G/DL (ref 13–17.7)
IMM GRANULOCYTES # BLD AUTO: 0.01 10*3/MM3 (ref 0–0.05)
IMM GRANULOCYTES NFR BLD AUTO: 0.3 % (ref 0–0.5)
LYMPHOCYTES # BLD AUTO: 1.26 10*3/MM3 (ref 0.7–3.1)
LYMPHOCYTES NFR BLD AUTO: 32.1 % (ref 19.6–45.3)
MCH RBC QN AUTO: 31.9 PG (ref 26.6–33)
MCHC RBC AUTO-ENTMCNC: 35.5 G/DL (ref 31.5–35.7)
MCV RBC AUTO: 89.8 FL (ref 79–97)
MONOCYTES # BLD AUTO: 0.26 10*3/MM3 (ref 0.1–0.9)
MONOCYTES NFR BLD AUTO: 6.6 % (ref 5–12)
NEUTROPHILS NFR BLD AUTO: 2.31 10*3/MM3 (ref 1.7–7)
NEUTROPHILS NFR BLD AUTO: 59 % (ref 42.7–76)
NRBC BLD AUTO-RTO: 0 /100 WBC (ref 0–0.2)
PLATELET # BLD AUTO: 204 10*3/MM3 (ref 140–450)
PMV BLD AUTO: 10.4 FL (ref 6–12)
POTASSIUM SERPL-SCNC: 3.9 MMOL/L (ref 3.5–5.2)
RBC # BLD AUTO: 4.99 10*6/MM3 (ref 4.14–5.8)
SODIUM SERPL-SCNC: 140 MMOL/L (ref 136–145)
WBC NRBC COR # BLD: 3.92 10*3/MM3 (ref 3.4–10.8)

## 2022-12-05 PROCEDURE — 80048 BASIC METABOLIC PNL TOTAL CA: CPT

## 2022-12-05 PROCEDURE — 85025 COMPLETE CBC W/AUTO DIFF WBC: CPT

## 2022-12-05 PROCEDURE — 36415 COLL VENOUS BLD VENIPUNCTURE: CPT
